# Patient Record
Sex: MALE | URBAN - METROPOLITAN AREA
[De-identification: names, ages, dates, MRNs, and addresses within clinical notes are randomized per-mention and may not be internally consistent; named-entity substitution may affect disease eponyms.]

---

## 2020-11-09 ENCOUNTER — OFFICE VISIT (OUTPATIENT)
Dept: URBAN - METROPOLITAN AREA CLINIC 32 | Facility: CLINIC | Age: 40
End: 2020-11-09
Payer: COMMERCIAL

## 2020-11-09 DIAGNOSIS — H10.213 ACUTE BILATERAL CHEMICAL CONJUNCTIVITIS: Primary | ICD-10-CM

## 2020-11-09 DIAGNOSIS — H53.002 AMBLYOPIA OF LEFT EYE: ICD-10-CM

## 2020-11-09 PROCEDURE — 92004 COMPRE OPH EXAM NEW PT 1/>: CPT | Performed by: OPHTHALMOLOGY

## 2020-11-09 RX ORDER — ERYTHROMYCIN 5 MG/G
OINTMENT OPHTHALMIC
Qty: 1 | Refills: 0 | Status: ACTIVE
Start: 2020-11-09

## 2020-11-09 ASSESSMENT — INTRAOCULAR PRESSURE
OS: 14
OD: 14

## 2020-11-09 NOTE — IMPRESSION/PLAN
Impression: Acute bilateral chemical conjunctivitis: H10.213. Plan: Spilled dishwashing detergent in his eyes -- today PEE OU but otherwise looks okay -- recommend AT qid and will have him to erythromycin ointment qhs OU Recheck 2w sooner prn

## 2020-11-09 NOTE — IMPRESSION/PLAN
Impression: Amblyopia of left eye: H53.002.  Plan: Left eye poor for many years -- obvious misalignment on exam follow.